# Patient Record
Sex: FEMALE | Race: WHITE | URBAN - METROPOLITAN AREA
[De-identification: names, ages, dates, MRNs, and addresses within clinical notes are randomized per-mention and may not be internally consistent; named-entity substitution may affect disease eponyms.]

---

## 2018-04-16 ENCOUNTER — OFFICE VISIT (OUTPATIENT)
Dept: URGENT CARE | Facility: URGENT CARE | Age: 23
End: 2018-04-16
Payer: COMMERCIAL

## 2018-04-16 ENCOUNTER — HOSPITAL ENCOUNTER (EMERGENCY)
Facility: CLINIC | Age: 23
Discharge: HOME | End: 2018-04-16
Attending: EMERGENCY MEDICINE | Admitting: EMERGENCY MEDICINE
Payer: COMMERCIAL

## 2018-04-16 VITALS
DIASTOLIC BLOOD PRESSURE: 70 MMHG | TEMPERATURE: 97.9 F | WEIGHT: 174 LBS | HEART RATE: 80 BPM | SYSTOLIC BLOOD PRESSURE: 102 MMHG

## 2018-04-16 VITALS
HEART RATE: 47 BPM | TEMPERATURE: 98.3 F | SYSTOLIC BLOOD PRESSURE: 123 MMHG | HEIGHT: 67 IN | RESPIRATION RATE: 16 BRPM | BODY MASS INDEX: 27.44 KG/M2 | WEIGHT: 174.82 LBS | OXYGEN SATURATION: 100 % | DIASTOLIC BLOOD PRESSURE: 61 MMHG

## 2018-04-16 DIAGNOSIS — R10.2 PELVIC PAIN: ICD-10-CM

## 2018-04-16 DIAGNOSIS — M54.9 BACK PAIN, UNSPECIFIED BACK LOCATION, UNSPECIFIED BACK PAIN LATERALITY, UNSPECIFIED CHRONICITY: ICD-10-CM

## 2018-04-16 DIAGNOSIS — R20.0 LEG NUMBNESS: ICD-10-CM

## 2018-04-16 DIAGNOSIS — R10.9 CONTINUOUS SEVERE ABDOMINAL PAIN: Primary | ICD-10-CM

## 2018-04-16 PROCEDURE — 99203 OFFICE O/P NEW LOW 30 MIN: CPT | Performed by: FAMILY MEDICINE

## 2018-04-16 PROCEDURE — 99283 EMERGENCY DEPT VISIT LOW MDM: CPT

## 2018-04-16 PROCEDURE — 25000132 ZZH RX MED GY IP 250 OP 250 PS 637: Performed by: EMERGENCY MEDICINE

## 2018-04-16 RX ORDER — ACETAMINOPHEN 500 MG
1000 TABLET ORAL ONCE
Status: COMPLETED | OUTPATIENT
Start: 2018-04-16 | End: 2018-04-16

## 2018-04-16 RX ORDER — CYCLOBENZAPRINE HCL 10 MG
10 TABLET ORAL ONCE
Status: COMPLETED | OUTPATIENT
Start: 2018-04-16 | End: 2018-04-16

## 2018-04-16 RX ADMIN — CYCLOBENZAPRINE HYDROCHLORIDE 10 MG: 10 TABLET, FILM COATED ORAL at 17:52

## 2018-04-16 RX ADMIN — ACETAMINOPHEN 1000 MG: 500 TABLET, FILM COATED ORAL at 17:52

## 2018-04-16 ASSESSMENT — ENCOUNTER SYMPTOMS
VOMITING: 1
NAUSEA: 1
FREQUENCY: 0
DYSURIA: 0
DIARRHEA: 0
BLOOD IN STOOL: 0
HEMATURIA: 0
FEVER: 0
ABDOMINAL PAIN: 1
CONSTIPATION: 0

## 2018-04-16 NOTE — NURSING NOTE
Chief Complaint   Patient presents with     Abdominal Pain     states several stools today,not diarrhea,2 emesis, states has menses but normally not as severe pain. Rates pain8/10.     Diarrhea       Initial /70 (Cuff Size: Adult Regular)  Pulse 80  Temp 97.9  F (36.6  C) (Oral)  Wt 174 lb (78.9 kg)  LMP 04/13/2018 There is no height or weight on file to calculate BMI.  Medication Reconciliation: complete Eliel MOROCHO

## 2018-04-16 NOTE — DISCHARGE INSTRUCTIONS
Pelvic Pain, Uncertain Cause    Pelvic pain is pain felt in the lowest part of the belly (abdomen) and between the hipbones. The pain may be acute. This means it occurred suddenly and recently. Or the pain may be chronic. This means it has occurred for 6 months or longer.  There are many possible causes of pelvic pain. The pain may be due to a problem in the female reproductive system (pictured here). Or, it may be due to a problem in the digestive, urinary, or musculoskeletal systems.  Based on your visit today, the exact cause of your pelvic pain is not certain. Your condition does not appear to be serious at this time. But it is important for you to keep watching for any new symptoms or worsening of your condition.  General care  Your healthcare provider may advise a number of ways to help manage your pain. These can include:    Taking over-the-counter pain medicine. Stronger pain medicine may also be prescribed, if needed.    Applying heat to the pelvic area. Use a heating pad or a hot pack. Taking a hot bath may also help.    Getting plenty of rest.    Making certain lifestyle changes. These can include practicing good posture and getting regular exercise. (Studies have shown that these changes help reduce pelvic pain in some women.)    Seeing a physical therapist or pain specialist. These healthcare providers can discuss other ways to manage pain with you.  Follow-up care  Follow up with your healthcare provider, or as advised.   When to seek medical advice  Call your healthcare provider right away if any of the following occur:    Fever of 100.4 F or higher, or as directed by your healthcare provider    Pain worsens or you have sudden, severe pain or new pain    Nausea, vomiting, sweating, or restlessness    Dizziness or fainting    Unusual vaginal discharge    Abnormal vaginal bleeding (especially bleeding after menopause)  Date Last Reviewed: 6/11/2015 2000-2017 [employer]. [address] [place] [address]  [place] [address]. All rights reserved. This information is not intended as a substitute for professional medical care. Always follow your healthcare professional's instructions.

## 2018-04-16 NOTE — ED AVS SNAPSHOT
Emergency Department    [address] [place] [address]    Phone:  [phone]    Fax:  [phone]                                       Samantha Shook   MRN: [id number]    Department:   Emergency Department   Date of Visit:  4/16/2018           After Visit Summary Signature Page     I have received my discharge instructions, and my questions have been answered. I have discussed any challenges I see with this plan with the nurse or doctor.    ..........................................................................................................................................  Patient/Patient Representative Signature      ..........................................................................................................................................  Patient Representative Print Name and Relationship to Patient    ..................................................               ................................................  Date                                            Time    ..........................................................................................................................................  Reviewed by Signature/Title    ...................................................              ..............................................  Date                                                            Time

## 2018-04-16 NOTE — MR AVS SNAPSHOT
"              After Visit Summary   2018    Samantha Shook    MRN: [id number]           Patient Information     Date Of Birth          1995        Visit Information        Provider Department      2018 4:15 PM James Muro DO [facility]        Today's Diagnoses     Continuous severe abdominal pain    -  1    Leg numbness        Back pain, unspecified back location, unspecified back pain laterality, unspecified chronicity           Follow-ups after your visit        Who to contact     If you have questions or need follow up information about today's clinic visit or your schedule please contact [facility] directly at [phone].  Normal or non-critical lab and imaging results will be communicated to you by MyChart, letter or phone within 4 business days after the clinic has received the results. If you do not hear from us within 7 days, please contact the clinic through MyChart or phone. If you have a critical or abnormal lab result, we will notify you by phone as soon as possible.  Submit refill requests through [employer] or call your pharmacy and they will forward the refill request to us. Please allow 3 business days for your refill to be completed.          Additional Information About Your Visit        MyChart Information     [employer] lets you send messages to your doctor, view your test results, renew your prescriptions, schedule appointments and more. To sign up, go to [email or web address] . Click on \"Log in\" on the left side of the screen, which will take you to the Welcome page. Then click on \"Sign up Now\" on the right side of the page.     You will be asked to enter the access code listed below, as well as some personal information. Please follow the directions to create your username and password.     Your access code is: [email or web address]  Expires: 7/15/2018  4:38 PM     Your access code will  in 90 days. If you need help or " a new code, please call your [facility] clinic or [phone].        Care EveryWhere ID     This is your Care EveryWhere ID. This could be used by other organizations to access your [facility] medical records  [id number]        Your Vitals Were     Pulse Temperature Last Period             80 97.9  F (36.6  C) (Oral) 04/13/2018          Blood Pressure from Last 3 Encounters:   04/16/18 102/70    Weight from Last 3 Encounters:   04/16/18 174 lb (78.9 kg)              Today, you had the following     No orders found for display       Primary Care Provider Fax #    Physician No Ref-Primary [phone]       No address on file        Equal Access to Services     [facility] CLARICE : Hadii lupe Cardoso, waaxda luqadaha, qaybta kaalmada leonorda, corby cunningham . So [facility] [phone].    ATENCIÓN: Si habla español, tiene a payton disposición servicios gratuitos de asistencia lingüística. Llame al [phone].    We comply with applicable federal civil rights laws and [place] laws. We do not discriminate on the basis of race, color, national origin, age, disability, sex, sexual orientation, or gender identity.            Thank you!     Thank you for choosing [facility]  for your care. Our goal is always to provide you with excellent care. Hearing back from our patients is one way we can continue to improve our services. Please take a few minutes to complete the written survey that you may receive in the mail after your visit with us. Thank you!             Your Updated Medication List - Protect others around you: Learn how to safely use, store and throw away your medicines at www.disposemymeds.org.      Notice  As of 4/16/2018  4:38 PM    You have not been prescribed any medications.

## 2018-04-16 NOTE — ED PROVIDER NOTES
History     Chief Complaint:  Abdominal Pain     HPI   Samantha Shook is a generally healthy 22 year old female who presents to the emergency department  for evaluation of abdominal pain. The patient states that she awoke this morning feeling generally well, though at 1300 began having cramping central suprapubic abdominal pain, which has been fairly persistent since onset. Of note, the patient is currently on day 3 of her menstrual cycle and states that her current pain is somewhat similar in nature to her typical menstrual cramps, though is much more intense in nature. She has been nauseated and had two episodes of vomiting as well. She was seen for these symptoms in urgent care this afternoon, at which time she was referred here to the ED for further evaluation. The patient took 600 mg of ibuprofen prior to arrival this afternoon. She denies any recent measured fever, change in stools, abnormal vaginal discharge, increase in typical menstrual bleeding, dysuria, frequency, and hematuria. She denies chance of pregnancy.     Allergies:  NKDA     Medications:    The patient is currently on no regular medications.      Past Medical History:    The patient denies any significant past medical history.    Past Surgical History:    The patient does not have any pertinent past surgical history  Family / Social History:    No past pertinent family history.     Social History:  Presents with her significant.   Negative for tobacco use.  Marital Status:  Single [1]    Review of Systems   Constitutional: Negative for fever.   Gastrointestinal: Positive for abdominal pain, nausea and vomiting. Negative for blood in stool, constipation and diarrhea.   Genitourinary: Positive for pelvic pain. Negative for dysuria, frequency, hematuria, vaginal bleeding and vaginal discharge.   All other systems reviewed and are negative.    Physical Exam     Physical Exam    Patient Vitals for the past 24 hrs:   BP Temp Temp src Pulse Resp  "SpO2 Height Weight   04/16/18 1709 123/61 98.3  F (36.8  C) Oral (!) 47 16 100 % 1.702 m (5' 7\") 79.3 kg (174 lb 13.2 oz)       General: Alert and Interactive.   Head: No signs of trauma.   Mouth/Throat: Oropharynx is clear and moist.   Eyes: Conjunctivae are normal. Pupils are equal, round, and reactive to light.   Neck: Normal range of motion. No nuchal rigidity.   CV: Normal rate and regular rhythm.    Resp: Effort normal and breath sounds normal. No respiratory distress.   GI: Soft. Suprapubic abdominal pain. There is no guarding.   MSK: Normal range of motion. no edema.   Neuro: The patient is alert and oriented to person, place, and time.  PERRLA, EOMI, strength in upper/lower extremities normal and symmetrical.   Sensation normal. Speech normal.  GCS eye subscore is 4. GCS verbal subscore is 5. GCS motor subscore is 6.   Skin: Skin is warm and dry. No rash noted.   Psych: normal mood and affect. behavior is normal.     Emergency Department Course   Interventions:  1752 Flexeril 10 mg PO    Tylenol 1000 mg PO    Emergency Department Course:  Nursing notes and vitals reviewed. 1740 I performed an exam of the patient as documented above. I offered the patient labs and US imaging for further evaluation of her symptoms, though she has declined these studies at this time.    1830 I rechecked the patient after the above interventions. She is feeling improved at this time.     Findings and plan explained to the Patient. Patient discharged home with instructions regarding supportive care, medications, and reasons to return. The importance of close follow-up was reviewed.   Impression & Plan    Medical Decision Making:  Samantha Shook is a 22 year old female who presents with pelvic pain and vomiting.   She is currently not pregnant.  They look overall well and have a reassuring exam.  A broad differential diagnosis was considered including colitis, appendicitis, intestinal cramping,  pyelonephritis, UTI, kidney " stone, constipation, diverticulitis, endometriosis, obstruction, ovarian cyst (enlarged or ruptured), ovarian torsion,  PID, TOA, as most likely possibilities.    The patient declined further workup of her pelvic pain at this time after the above medications caused her to feel much better.  I am recommending urinalysis, blood work, pelvic exam and pelvic ultrasound to evaluate her further.  She is refusing these tests but will agree to return to the ED for further evaluation if her pain returns.    Patient is hemodynamically stable in ED. Plan is home with 12 hour abdominal pain recheck by gynecology or return to ED at that time.  Return for fevers greater than 102, increasing pain, other new symptoms develop.     Diagnosis:    ICD-10-CM   1. Pelvic pain R10.2     Disposition:  discharged to home    I, Tila Shane, am serving as a scribe on 4/16/2018 at 5:40 PM to personally document services performed by Parth Torres MD based on my observations and the provider's statements to me.     Tila Shane  4/16/2018    EMERGENCY DEPARTMENT       Parth Torres MD  04/17/18 0123

## 2018-04-16 NOTE — PROGRESS NOTES
"SUBJECTIVE  HPI: Samantha Shook is a 22 year old female  who presents with the CC of abdominal/pelvic pain.   Pain is located in the suprapubic area, with radiation to back    The pain is characterized as \"pain\".    Pain has been present for 1 pm and is stable.     EXACERBATING FACTORS: NEGATIVE.   RELIEVING FACTORS: NEGATIVE.    ASSOCIATED SX: leg numbness and Vomited twice.     No past medical history on file.  No Known Allergies  Social History   Substance Use Topics     Smoking status: Never Smoker     Smokeless tobacco: Never Used     Alcohol use Not on file       ROS:CONSTITUTIONAL:NEGATIVE for fever, chills, change in weight  GI: NEGATIVE for constipation and diarrhea  : negative for and dysuria    EXAMINATION:  /70 (Cuff Size: Adult Regular)  Pulse 80  Temp 97.9  F (36.6  C) (Oral)  Wt 174 lb (78.9 kg)  LMP 04/13/2018   GENERAL APPEARANCE: healthy, alert and no distress  ABDOMEN: tenderness moderate suprapubic      ICD-10-CM    1. Continuous severe abdominal pain R10.9    2. Leg numbness R20.0    3. Back pain, unspecified back location, unspecified back pain laterality, unspecified chronicity M54.9      Pt will be triaged to ED for Continuous Severe Abd Pain  "

## 2018-04-16 NOTE — ED AVS SNAPSHOT
Emergency Department    [address] [facility] [address]    Phone:  [phone]    Fax:  [phone]                                       Samantha Shook   MRN: [id number]    Department:   Emergency Department   Date of Visit:  4/16/2018           Patient Information     Date Of Birth          1995        Your diagnoses for this visit were:     Pelvic pain        You were seen by Parth Torres MD.      Follow-up Information     Follow up with  Emergency Department Today.    Specialty:  EMERGENCY MEDICINE    Why:  If symptoms return    Contact information:    [address] [facility] [address] [phone]        Discharge Instructions         Pelvic Pain, Uncertain Cause    Pelvic pain is pain felt in the lowest part of the belly (abdomen) and between the hipbones. The pain may be acute. This means it occurred suddenly and recently. Or the pain may be chronic. This means it has occurred for 6 months or longer.  There are many possible causes of pelvic pain. The pain may be due to a problem in the female reproductive system (pictured here). Or, it may be due to a problem in the digestive, urinary, or musculoskeletal systems.  Based on your visit today, the exact cause of your pelvic pain is not certain. Your condition does not appear to be serious at this time. But it is important for you to keep watching for any new symptoms or worsening of your condition.  General care  Your healthcare provider may advise a number of ways to help manage your pain. These can include:    Taking over-the-counter pain medicine. Stronger pain medicine may also be prescribed, if needed.    Applying heat to the pelvic area. Use a heating pad or a hot pack. Taking a hot bath may also help.    Getting plenty of rest.    Making certain lifestyle changes. These can include practicing good posture and getting regular exercise. (Studies have shown that these changes help reduce pelvic pain  in some women.)    Seeing a physical therapist or pain specialist. These healthcare providers can discuss other ways to manage pain with you.  Follow-up care  Follow up with your healthcare provider, or as advised.   When to seek medical advice  Call your healthcare provider right away if any of the following occur:    Fever of 100.4 F or higher, or as directed by your healthcare provider    Pain worsens or you have sudden, severe pain or new pain    Nausea, vomiting, sweating, or restlessness    Dizziness or fainting    Unusual vaginal discharge    Abnormal vaginal bleeding (especially bleeding after menopause)  Date Last Reviewed: 6/11/2015 2000-2017 [employer]. [address] [place] [address]. All rights reserved. This information is not intended as a substitute for professional medical care. Always follow your healthcare professional's instructions.          24 Hour Appointment Hotline       To make an appointment at [facility], call [phone] ([phone]). If you don't have a family doctor or clinic, we will help you find one. [facility] are conveniently located to serve the needs of you and your family.             Review of your medicines      Notice     You have not been prescribed any medications.            Orders Needing Specimen Collection     None      Pending Results     No orders found from 4/14/2018 to 4/17/2018.            Pending Culture Results     No orders found from 4/14/2018 to 4/17/2018.            Pending Results Instructions     If you had any lab results that were not finalized at the time of your Discharge, you can call the ED Lab Result RN at [phone]. You will be contacted by this team for any positive Lab results or changes in treatment. The nurses are available 7 days a week from 10A to 6:30P.  You can leave a message 24 hours per day and they will return your call.        Test Results From Your Hospital Stay                Clinical Quality Measure: Blood Pressure Screening     Your blood pressure was checked while you were in the emergency department today. The last reading we obtained was  BP: 123/61 . Please read the guidelines below about what these numbers mean and what you should do about them.  If your systolic blood pressure (the top number) is less than 120 and your diastolic blood pressure (the bottom number) is less than 80, then your blood pressure is normal. There is nothing more that you need to do about it.  If your systolic blood pressure (the top number) is 120-139 or your diastolic blood pressure (the bottom number) is 80-89, your blood pressure may be higher than it should be. You should have your blood pressure rechecked within a year by a primary care provider.  If your systolic blood pressure (the top number) is 140 or greater or your diastolic blood pressure (the bottom number) is 90 or greater, you may have high blood pressure. High blood pressure is treatable, but if left untreated over time it can put you at risk for heart attack, stroke, or kidney failure. You should have your blood pressure rechecked by a primary care provider within the next 4 weeks.  If your provider in the emergency department today gave you specific instructions to follow-up with your doctor or provider even sooner than that, you should follow that instruction and not wait for up to 4 weeks for your follow-up visit.        Thank you for choosing [facility]       Thank you for choosing [facility] for your care. Our goal is always to provide you with excellent care. Hearing back from our patients is one way we can continue to improve our services. Please take a few minutes to complete the written survey that you may receive in the mail after you visit with us. Thank you!        [employer] Information     "[employer] lets you send messages to your doctor, view your test results, renew your prescriptions, schedule appointments and more. To sign up,  "go to [email or web address] . Click on \"Log in\" on the left side of the screen, which will take you to the Welcome page. Then click on \"Sign up Now\" on the right side of the page.     You will be asked to enter the access code listed below, as well as some personal information. Please follow the directions to create your username and password.     Your access code is: [email or web address]  Expires: 7/15/2018  4:38 PM     Your access code will  in 90 days. If you need help or a new code, please call your [facility] clinic or [phone].        Care EveryWhere ID     This is your Care EveryWhere ID. This could be used by other organizations to access your [facility] medical records  [id number]        Equal Access to Services     JULIETA ONEIL : Geovani Cardoso, abel cornell, mohan garza, corby larson. So [facility] [phone].    ATENCIÓN: Si habla español, tiene a payton disposición servicios gratuitos de asistencia lingüística. Lljackie al [phone].    We comply with applicable federal civil rights laws and [place] laws. We do not discriminate on the basis of race, color, national origin, age, disability, sex, sexual orientation, or gender identity.            After Visit Summary       This is your record. Keep this with you and show to your community pharmacist(s) and doctor(s) at your next visit.                  "